# Patient Record
Sex: FEMALE | Race: WHITE
[De-identification: names, ages, dates, MRNs, and addresses within clinical notes are randomized per-mention and may not be internally consistent; named-entity substitution may affect disease eponyms.]

---

## 2020-08-03 ENCOUNTER — HOSPITAL ENCOUNTER (EMERGENCY)
Dept: HOSPITAL 75 - ER FS | Age: 60
Discharge: HOME | End: 2020-08-03
Payer: COMMERCIAL

## 2020-08-03 VITALS — SYSTOLIC BLOOD PRESSURE: 136 MMHG | DIASTOLIC BLOOD PRESSURE: 85 MMHG

## 2020-08-03 VITALS — HEIGHT: 67.99 IN | BODY MASS INDEX: 28.43 KG/M2 | WEIGHT: 187.61 LBS

## 2020-08-03 DIAGNOSIS — S01.81XA: ICD-10-CM

## 2020-08-03 DIAGNOSIS — Z23: ICD-10-CM

## 2020-08-03 DIAGNOSIS — S01.111A: Primary | ICD-10-CM
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
## 2020-08-03 PROCEDURE — 90715 TDAP VACCINE 7 YRS/> IM: CPT

## 2020-08-03 NOTE — ED FALL/INJURY
General


Chief Complaint:  Laceration


Stated Complaint:  RT EYE LAC


Source:  patient


Exam Limitations:  no limitations





History of Present Illness


Date Seen by Provider:  Aug 3, 2020


Time Seen by Provider:  12:00


Initial Comments


The patient is a pleasant 59-year-old female who presents for evaluation of a 

right forehead and right eyebrow laceration. She states that she was using a 

ladder when she fell and hit her face on a railing. She did not black out or 

lose consciousness. She has no headache, nausea, vision changes, focal weakness 

or numbness, dizziness or syncope. She is alert and oriented 4, calm, and 

appears to be in no distress. She believes that her last tetanus shot was 7 

years ago but is unsure so it will be given today.


Occurred:  just prior to arrival


Severity:  moderate


Injuries/Pain Location:  face


Context:  lost balance


Loss of Consciousness:  no loss of consciousness


Modifying Factors:  Improves With Cold Therapy (helps)


Associated Symptoms (Fall):  Denies Symptoms





Allergies and Home Medications


Allergies


Coded Allergies:  


     codeine (Verified  Allergy, Unknown, 8/3/20)





Patient Home Medication List


Home Medication List Reviewed:  Yes





Review of Systems


Review of Systems


Constitutional:  no symptoms reported


Eyes:  No Symptoms Reported


Ears, Nose, Mouth, Throat:  see HPI


Respiratory:  no symptoms reported


Cardiovascular:  no symptoms reported


Gastrointestinal:  no symptoms reported


Genitourinary:  no symptoms reported


Musculoskeletal:  no symptoms reported


Skin:  no symptoms reported


Psychiatric/Neurological:  No Symptoms Reported





All Other Systems Reviewed


Negative Unless Noted:  Yes





Past Medical-Social-Family Hx


Past Med/Social Hx:  Reviewed Nursing Past Med/Soc Hx


Patient Social History


Recent Foreign Travel:  No


Contact w/Someone Who Travel:  No





Physical Exam


Vital Signs





Vital Signs - First Documented








 8/3/20





 11:50


 


Temp 37.1


 


Pulse 96


 


Resp 14


 


B/P (MAP) 136/85 (102)


 


Pulse Ox 93


 


O2 Delivery Room Air





Capillary Refill :


Height, Weight, BMI


Height: '"


Weight: lbs. oz. kg;  BMI


Method:


General Appearance:  WD/WN


HEENT:  PERRL/EOMI, pharynx normal


Neck:  non-tender, full range of motion, supple, normal inspection


Cardiovascular:  regular rate, rhythm, no edema, no murmur


Respiratory:  lungs clear, normal breath sounds, no respiratory distress, no 

accessory muscle use


Gastrointestinal:  soft


Back:  normal inspection, no CVA tenderness, no vertebral tenderness


Extremities:  normal range of motion, non-tender, normal inspection, no pedal 

edema


Neurologic/Psychiatric:  CNs II-XII nml as tested, no motor/sensory deficits, 

alert, normal mood/affect, oriented x 3


Skin:  normal color, warm/dry, other (small laceration to the right lower 

forehead approximately 0.75 cm in length, additional laceration just below the 

right mid to lateral eyebrow approximately 3 cm in length)





[place] Coma Score


Best Eye Response:  (4) Open Spontaneously


Best Verbal Response:  (5) Oriented


Best Motor Response:  (6) Obeys Commands





Procedures/Interventions





   Wound Location:  Face


   Wound Length (cm):  3


   Wound's Depth, Shape:  linear


   Wound Explored:  clean


   Irrigated w/ Saline (ccs):  500


   Anesthesia:  1% Lidocaine


   Volume Anesthetic (ccs):  2


   Suture:  Ethlion


   Suture Size:  5-0


   Number of Sutures:  5


   Layer Closure?:  1


   Number Deep Layer Sutures:  0


Progress


Patient tolerated the repair of her 3 cm eyebrow laceration with sutures very 

well. 





The patient had an additional 0.75 cm laceration to the right lower forehead 

which was repaired with glue and 2 Steri-Strips. This laceration was linear and 

was also irrigated in a similar fashion. The patient tolerated this repair 

without any Conditions.





Progress/Results/Core Measures


Results/Orders


My Orders





Orders - TATI FUNK DO


Lidocaine 1% Inj 20 Ml (Xylocaine 1% Inj (8/3/20 12:30)


Dipht,Pertuss(Acell),Tet Adult (Boostrix (8/3/20 12:30)





Vital Signs/I&O











 8/3/20





 11:50


 


Temp 37.1


 


Pulse 96


 


Resp 14


 


B/P (MAP) 136/85 (102)


 


Pulse Ox 93


 


O2 Delivery Room Air











Progress


Progress Note :  


Progress Note


@1240 - the patient has no bony tenderness on her face, head, or neck. Imaging 

was offered but declined. She tolerated the laceration repair very well. Sutures

need to be removed in 5-7 days. Advised the patient to return to the emergency 

department or her doctor's office to have this accomplished. Tetanus was 

updated. Advised the patient to return to the [facility] Department immediately for 

concerns or infection, new, or worsening symptoms. The patient expresses verbal 

understanding and agreement.





Departure


Impression





   Primary Impression:  


   Laceration of forehead


   Additional Impression:  


   Laceration of right eyebrow


Disposition:  01 HOME, SELF-CARE


Condition:  Stable





Departure-Patient Inst.


Decision time for Depature:  12:46


Referrals:  


[facility] [place]


Patient Instructions:  Laceration Repair With Stitches (DC)





Add. Discharge Instructions:  


Return to the emergency department or your doctor's office in 5-7 days for 

suture removal. If you feel like the wound may be infected return to the 

emergency Department immediately or if you experience new or worsening symptoms.

Take Tylenol or ibuprofen for pain relief is needed.











TATI FUNK DO               Aug 3, 2020 12:17

## 2020-08-03 NOTE — XMS REPORT
Continuity of Care Document

                             Created on: 2020



EVE LOPEZ

External Reference #: [id number]

: 1960

Sex: Female



Demographics





                          Home Phone                [phone]

 

                          Preferred Language        Unknown

 

                          Marital Status            Unknown

 

                          Yazidism Affiliation     Unknown

 

                          Race                      Unknown

 

                          Ethnic Group              Unknown





Author





                          Organization              Unknown

 

                          Address                   Unknown

 

                          Phone                     Unavailable



              



Allergies

      



There is no data.                  



Medications

      



There is no data.                  



Problems

      



There is no data.                  



Procedures

      



There is no data.                  



Results

      



There is no data.              



Encounters

      



There is no data.

## 2020-08-08 ENCOUNTER — HOSPITAL ENCOUNTER (EMERGENCY)
Dept: HOSPITAL 75 - ER FS | Age: 60
Discharge: HOME | End: 2020-08-08
Payer: COMMERCIAL

## 2020-08-08 VITALS — SYSTOLIC BLOOD PRESSURE: 137 MMHG | DIASTOLIC BLOOD PRESSURE: 75 MMHG

## 2020-08-08 DIAGNOSIS — S05.41XD: Primary | ICD-10-CM
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
## 2020-08-08 NOTE — XMS REPORT
Continuity of Care Document

                             Created on: 2020



EVE LOPEZ

External Reference #: [id number]

: 1960

Sex: Female



Demographics





                          Address                   [address] [place] [address]  [id number]

 

                          Home Phone                [phone]

 

                          Preferred Language        Unknown

 

                          Marital Status            Unknown

 

                          Jew Affiliation     Unknown

 

                          Race                      Unknown

 

                          Ethnic Group              Unknown





Author





                          Organization              Unknown

 

                          Address                   Unknown

 

                          Phone                     Unavailable



              



Allergies

      



             Active           Description           Code           Type         

  Severity   

                Reaction           Onset           Reported/Identified          

 

Relationship to Patient                 Clinical Status        

 

             Yes           codeine           [id number]           Drug Allergy  

         

Unknown           N/A                        2020                         

  

     



                  



Medications

      



There is no data.                  



Problems

      



             Date Dx Coded           Attending           Type           Code    

       

Diagnosis                               Diagnosed By        

 

                2020           BLESSING DUFFY DO           Ot            

  S01.111A         

                          LACERATION W/O FB OF RIGHT EYELID AND PE              

      

 

                2020           BLESSING DUFFY DO           Ot            

  S01.81XA         

                          LACERATION W/O FOREIGN BODY OF OTH PART               

      

 

                2020           BLESSING DUFFY DO           Ot            

  W11.XXXA         

                          FALL ON AND FROM LADDER, INITIAL ENCOUNT              

      

 

             2020           BLESSING DUFFY DO           Ot           Z23 

          

ENCOUNTER FOR IMMUNIZATION                       



                        



Procedures

      



There is no data.                  



Results

      



There is no data.              



Encounters

      



                ACCT No.           Visit Date/Time           Discharge          

 Status         

             Pt. Type           Provider           Facility           Loc./Unit 

          

Complaint        

 

                    [id number]           2020 11:42:00           

020 12:55:00        

                DIS             Outpatient           BLESSING DUFFY DO         

  [facility]           ER FS                     RT EYE LAC        

 

             [id number]           2020 12:59:00                        A

CT           

Emergency                 LESTER FONSECA DO           [facility]                 ER FS                     SUTURE REMOVAL

## 2020-08-08 NOTE — ED SUTURE REMOVAL/WOUND CHECK
Suture/Wound Re-check


General Appearance:  WD/WN, no apparent distress


Skin Exam:  normal color, warm/dry





Physical Exam


Vital Signs


Capillary Refill :


General Appearance:  WD/WN, no apparent distress


HEENT:  PERRL/EOMI


Neck:  non-tender


Extremities:  normal range of motion


Skin:  normal color, other (well healed laceration over the right orbit/eyebrow.

 Wound edges are well approximated.  No dehiscence, erythema, drainage.)





Departure


Impression





   Primary Impression:  


   Visit for suture removal


Disposition:  01 HOME, SELF-CARE


Condition:  Improved





Departure-Patient Inst.


Referrals:  


NO,LOCAL PHYSICIAN (PCP/Family)


Primary Care Physician


Patient Instructions:  SUTURE REMOVAL - UNCOMPLICATED, STAPLE REMOVAL - 

UNCOMPLICATED











LESTER FONSECA DO              Aug 8, 2020 13:10